# Patient Record
Sex: FEMALE | Race: WHITE | URBAN - METROPOLITAN AREA
[De-identification: names, ages, dates, MRNs, and addresses within clinical notes are randomized per-mention and may not be internally consistent; named-entity substitution may affect disease eponyms.]

---

## 2021-10-23 ENCOUNTER — WEB ENCOUNTER (OUTPATIENT)
Dept: URBAN - METROPOLITAN AREA CLINIC 19 | Facility: CLINIC | Age: 76
End: 2021-10-23

## 2021-10-26 ENCOUNTER — CLAIMS CREATED FROM THE CLAIM WINDOW (OUTPATIENT)
Dept: URBAN - METROPOLITAN AREA CLINIC 19 | Facility: CLINIC | Age: 76
End: 2021-10-26
Payer: MEDICARE

## 2021-10-26 ENCOUNTER — WEB ENCOUNTER (OUTPATIENT)
Dept: URBAN - METROPOLITAN AREA CLINIC 19 | Facility: CLINIC | Age: 76
End: 2021-10-26

## 2021-10-26 ENCOUNTER — TELEPHONE ENCOUNTER (OUTPATIENT)
Dept: URBAN - METROPOLITAN AREA CLINIC 19 | Facility: CLINIC | Age: 76
End: 2021-10-26

## 2021-10-26 ENCOUNTER — DASHBOARD ENCOUNTERS (OUTPATIENT)
Age: 76
End: 2021-10-26

## 2021-10-26 DIAGNOSIS — K58.9 IBS (IRRITABLE BOWEL SYNDROME): ICD-10-CM

## 2021-10-26 DIAGNOSIS — R12 HEARTBURN: ICD-10-CM

## 2021-10-26 DIAGNOSIS — R14.0 BLOATING: ICD-10-CM

## 2021-10-26 DIAGNOSIS — R14.3 FLATULENCE: ICD-10-CM

## 2021-10-26 PROBLEM — 16331000 HEARTBURN: Status: ACTIVE | Noted: 2021-10-26

## 2021-10-26 PROBLEM — 10743008 IRRITABLE BOWEL SYNDROME: Status: ACTIVE | Noted: 2021-10-26

## 2021-10-26 PROBLEM — 60728008 BLOATING: Status: ACTIVE | Noted: 2021-10-26

## 2021-10-26 PROBLEM — 249504006 FLATULENCE: Status: ACTIVE | Noted: 2021-10-26

## 2021-10-26 PROCEDURE — 99204 OFFICE O/P NEW MOD 45 MIN: CPT | Performed by: INTERNAL MEDICINE

## 2021-10-26 NOTE — HPI-TODAY'S VISIT:
Patient presents to my clinic primarily to establish care.  She was previously seeing Dr. Cody Vieira - records to be obtained.  She has chronic GERD characterized by excessive belching and heartburn as well as IBS characterized by excessive flatulence and "explosive" bowel movements.  He hearburn has gotten worse recently where it wakes her up from sleep.  She is looking for non-pharmaceutical treatments for her symptoms if possible.  She states that she had an EGD in 2019 and a colonoscopy in 2014 - results not available but will be obtained.  Patient is about to go on an extended RV trip around the country - will not be back until March 2022.

## 2024-08-15 ENCOUNTER — OFFICE VISIT (OUTPATIENT)
Dept: URBAN - METROPOLITAN AREA CLINIC 19 | Facility: CLINIC | Age: 79
End: 2024-08-15
Payer: MEDICARE

## 2024-08-15 ENCOUNTER — LAB OUTSIDE AN ENCOUNTER (OUTPATIENT)
Dept: URBAN - METROPOLITAN AREA CLINIC 19 | Facility: CLINIC | Age: 79
End: 2024-08-15

## 2024-08-15 VITALS
SYSTOLIC BLOOD PRESSURE: 134 MMHG | TEMPERATURE: 98.6 F | HEART RATE: 80 BPM | DIASTOLIC BLOOD PRESSURE: 82 MMHG | HEIGHT: 62 IN | WEIGHT: 130 LBS | BODY MASS INDEX: 23.92 KG/M2

## 2024-08-15 DIAGNOSIS — M53.3 COCCYX PAIN: ICD-10-CM

## 2024-08-15 DIAGNOSIS — Z12.11 SCREEN FOR COLON CANCER: ICD-10-CM

## 2024-08-15 PROCEDURE — 99213 OFFICE O/P EST LOW 20 MIN: CPT | Performed by: INTERNAL MEDICINE

## 2024-08-15 RX ORDER — OMEPRAZOLE 20 MG/1
1 CAPSULE 30 MINUTES BEFORE MORNING MEAL CAPSULE, DELAYED RELEASE ORAL ONCE A DAY
Status: ACTIVE | COMMUNITY

## 2024-08-15 NOTE — HPI-TODAY'S VISIT:
10/26/21: Patient presents to my clinic primarily to establish care.  She was previously seeing Dr. Cody Vieira - records to be obtained.  She has chronic GERD characterized by excessive belching and heartburn as well as IBS characterized by excessive flatulence and "explosive" bowel movements.  He hearburn has gotten worse recently where it wakes her up from sleep.  She is looking for non-pharmaceutical treatments for her symptoms if possible.  She states that she had an EGD in 2019 and a colonoscopy in 2014 - results not available but will be obtained.  Patient is about to go on an extended RV trip around the country - will not be back until March 2022.  8/15/24: Patient presents for a screening colonoscopy - her last colonoscopy was performed by Dr. Cody Vieira in 2013, and he recommended a 10-year repeat colonoscopy at that time. The only abnormality seen at that time were diverticula. She had some tailbone pain recently but was told by her pain specialist, Dr. Gilmar Dent, that it did not require any further evaluation after he ordered an MRI and did an exam. She has mild bowel irregularity - sometimes her stools are soft, and she takes an anti-diarrheal tablet daily. Sometimes she feels constipated. No blood in stool or other concerning symptoms. She asked about caffeinated beverages and turmeric today - I told her that I have no specific recommendations about their safety and effect on the bowels.

## 2024-11-13 ENCOUNTER — OFFICE VISIT (OUTPATIENT)
Dept: URBAN - METROPOLITAN AREA SURGERY CENTER 31 | Facility: SURGERY CENTER | Age: 79
End: 2024-11-13
Payer: COMMERCIAL

## 2024-11-13 DIAGNOSIS — Z12.11 ENCOUNTER FOR SCREENING FOR MALIGNANT NEOPLASM OF COLON: ICD-10-CM

## 2024-11-13 DIAGNOSIS — Z12.11 COLON CANCER SCREENING: ICD-10-CM

## 2024-11-13 PROCEDURE — 00812 ANES LWR INTST SCR COLSC: CPT | Performed by: NURSE ANESTHETIST, CERTIFIED REGISTERED

## 2024-11-13 PROCEDURE — 0528F RCMND FLW-UP 10 YRS DOCD: CPT | Performed by: INTERNAL MEDICINE

## 2024-11-13 PROCEDURE — G0121 COLON CA SCRN NOT HI RSK IND: HCPCS | Performed by: INTERNAL MEDICINE

## 2024-11-13 RX ORDER — OMEPRAZOLE 20 MG/1
1 CAPSULE 30 MINUTES BEFORE MORNING MEAL CAPSULE, DELAYED RELEASE ORAL ONCE A DAY
Status: ACTIVE | COMMUNITY

## 2024-11-22 ENCOUNTER — OFFICE VISIT (OUTPATIENT)
Dept: URBAN - METROPOLITAN AREA SURGERY CENTER 31 | Facility: SURGERY CENTER | Age: 79
End: 2024-11-22

## 2025-08-26 ENCOUNTER — OFFICE VISIT (OUTPATIENT)
Dept: URBAN - METROPOLITAN AREA CLINIC 19 | Facility: CLINIC | Age: 80
End: 2025-08-26
Payer: COMMERCIAL

## 2025-08-26 DIAGNOSIS — Q38.3 TONGUE ANOMALY: ICD-10-CM

## 2025-08-26 DIAGNOSIS — R19.4 CHANGE IN BOWEL HABITS: ICD-10-CM

## 2025-08-26 DIAGNOSIS — R68.2 DRY MOUTH: ICD-10-CM

## 2025-08-26 PROCEDURE — 99213 OFFICE O/P EST LOW 20 MIN: CPT | Performed by: INTERNAL MEDICINE

## 2025-08-26 RX ORDER — OMEPRAZOLE 20 MG/1
1 CAPSULE 30 MINUTES BEFORE MORNING MEAL CAPSULE, DELAYED RELEASE ORAL ONCE A DAY
Status: ACTIVE | COMMUNITY

## 2025-08-27 ENCOUNTER — TELEPHONE ENCOUNTER (OUTPATIENT)
Dept: URBAN - METROPOLITAN AREA CLINIC 19 | Facility: CLINIC | Age: 80
End: 2025-08-27

## 2025-08-27 LAB
SJOGREN'S ANTIBODY (SS-A): (no result)
SJOGREN'S ANTIBODY (SS-B): (no result)